# Patient Record
Sex: FEMALE | Race: WHITE | URBAN - METROPOLITAN AREA
[De-identification: names, ages, dates, MRNs, and addresses within clinical notes are randomized per-mention and may not be internally consistent; named-entity substitution may affect disease eponyms.]

---

## 2017-12-12 ENCOUNTER — HOSPITAL ENCOUNTER (EMERGENCY)
Facility: CLINIC | Age: 62
Discharge: HOME | End: 2017-12-12
Attending: EMERGENCY MEDICINE | Admitting: EMERGENCY MEDICINE
Payer: COMMERCIAL

## 2017-12-12 VITALS
DIASTOLIC BLOOD PRESSURE: 80 MMHG | RESPIRATION RATE: 20 BRPM | OXYGEN SATURATION: 98 % | TEMPERATURE: 98.1 F | SYSTOLIC BLOOD PRESSURE: 149 MMHG | HEART RATE: 94 BPM
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
## 2017-12-12 DIAGNOSIS — S09.90XA CLOSED HEAD INJURY, INITIAL ENCOUNTER: ICD-10-CM

## 2017-12-12 PROCEDURE — 99283 EMERGENCY DEPT VISIT LOW MDM: CPT

## 2017-12-12 RX ORDER — GINSENG 100 MG
CAPSULE ORAL
Status: DISCONTINUED
Start: 2017-12-12 | End: 2017-12-12 | Stop reason: HOSPADM

## 2017-12-12 ASSESSMENT — ENCOUNTER SYMPTOMS
NUMBNESS: 0
WOUND: 1
WEAKNESS: 0
HEADACHES: 1
NAUSEA: 0
NECK PAIN: 0
VOMITING: 0
FEVER: 0
DIARRHEA: 0
CHILLS: 0

## 2017-12-12 NOTE — ED PROVIDER NOTES
History     Chief Complaint:  Mechanical Fall     HPI   Bobbi Cintron is a non anticoagulated 62 year old female who presents to the emergency department for evaluation following a mechanical fall. The patient states that she was walking in the parking lot of the hotel where she is currently residing at about 1130 this morning when she slipped on the ice, falling to the ground and striking the back of her head on the ground. She sustained a small abrasion to the back of her head as a result, though did not lose consciousness. She additionally struck her right elbow as well and has had subsequent discomfort. This fall was concerning to her and prompted her ED visit today.     She denies any recent intense headache, vision changes or hearing changes, neck pain, extremity weakness or numbness, nausea, vomiting, diarrhea, fevers or chills. The patient denies sustaining any other injuries as a result of this incident and she is able to ambulate without issue. She took 600 mg of ibuprofen this morning for her somewhat chronic low back pain. Her last tetanus shot was approximately one year ago per her report.     Allergies:  Erythromycin  Penicillin [Esters]    Medications:    Doxycycline  Clindamycin  Lutera  Effexor  Trazodone  Provigil   Welchol    Past Medical History:    Anxiety  Back Pain  Breast Cancer  hyperlipidemia    Past Surgical History:    Vascular port placement  Mastectomy  Orthopedic knee surgery    Family History:    No past pertinent family history.    Social History:  Presents alone.   Former Smoker.   Negative for alcohol use.  Marital Status:   [4]    Review of Systems   Constitutional: Negative for chills and fever.   HENT: Negative for hearing loss.    Eyes: Negative for visual disturbance.   Gastrointestinal: Negative for diarrhea, nausea and vomiting.   Musculoskeletal: Negative for gait problem and neck pain.   Skin: Positive for wound.   Neurological: Positive for headaches  (Head Pain). Negative for weakness and numbness.   All other systems reviewed and are negative.    Physical Exam     Patient Vitals for the past 24 hrs:   BP Temp Temp src Pulse Resp SpO2   12/12/17 1139 149/80 98.1  F (36.7  C) Oral 94 20 98 %       Physical Exam  Physical Exam   General: Resting on the bed.  Head: No obvious trauma to head.  Ears, Nose, Throat:  External ears normal.  Nose normal.  No pharyngeal erythema, swelling or exudate.  Midline uvula.    Eyes:  Conjunctivae and EOM are normal.  Pupils are equal, round, and reactive.   Neck: Normal range of motion.  Neck supple.   CV: Regular rate and rhythm.  No murmurs.      Respiratory: Effort normal and breath sounds normal.  No wheezing or crackles.   Gastrointestinal: Soft.  No distension. There is no tenderness.  There is no rigidity, no rebound and no guarding.   Musculoskeletal: Normal range of motion.   Neuro: Alert. Moving all extremities appropriately.  Normal speech.  CN II-XII grossly intact.  Gross muscle strength intact of the proximal and distal bilateral upper and lower extremities.  Sensation intact to light touch in all 4 extremities.  Normal gait.  GCS 15.    Skin: Skin is warm and dry.  No rash noted.     Emergency Department Course   Interventions:  Bacitracin 500 mg Topical     Emergency Department Course:  Nursing notes and vitals reviewed. 1139 I performed an exam of the patient as documented above.     Findings and plan explained to the Patient. Patient discharged home with instructions regarding supportive care, medications, and reasons to return. The importance of close follow-up was reviewed.     Impression & Plan    Medical Decision Making:  Bobbi Cintron is a 62 year old female who is otherwise healthy and is presenting with a fall. She had a mechanical fall on the ice. Vital signs reviewed and are unremarkable. Broad differential pursued, including but not limited to intracranial hemorrhage, concussion, contusion,  skull fracture or dislocation, bony injury, etc. Per [place] head CT rules, the patient does not require imaging. She had no loss of consciousness. She has no gross deformity to suggest skull fracture. She has not vomited. She is alert and orientated, with GCS 15. This does not even appear to be concerning for a concussion at this time. There is a large parietal hematoma, but there are no deformities or injuries. I was able to clinically clear her C-spine per [facility] rules. The remainder of her trauma evaluation showed no other traumatic injuries. She had a small bruise to the posterior aspect of her right elbow, but had full range of motion and no bony tenderness. This does not appear consistent with a fracture or dislocation. She is otherwise doing well. She is not on any blood thinners. No other red flags. She is otherwise young and healthy. We will discharge home for close primary care follow up.     Diagnosis:    ICD-10-CM   1. Fall, initial encounter W19.XXXA   2. Closed head injury, initial encounter S09.90XA       Disposition:  discharged to home  Ruby PALMER, am serving as a scribe on 12/12/2017 at 11:39 AM to personally document services performed by Jami Freire MD based on my observations and the provider's statements to me.     Ruby Preston  12/12/2017   [facility] EMERGENCY DEPARTMENT       Jami Freire MD  12/12/17 2975

## 2017-12-12 NOTE — ED AVS SNAPSHOT
[facility] Emergency Department    [address]    [facility] [address]    Phone:  [phone]    Fax:  [phone]                                       Bobbi Cintron   MRN: [id number]    Department:  [facility] Emergency Department   Date of Visit:  12/12/2017           Patient Information     Date Of Birth          1955        Your diagnoses for this visit were:     Fall, initial encounter     Closed head injury, initial encounter        You were seen by Jami Freire MD.      Follow-up Information     Follow up with [facility]. Schedule an appointment as soon as possible for a visit in 1 day.    Specialties:  Sports Medicine, Pain Management, Obstetrics & Gynecology, Pediatrics, Internal Medicine, Nephrology    Contact information:    [address] [place] [address]  [facility] [address] [phone]        Discharge Instructions       Please follow with your PCP in 2-3 days.  Return to the ED if notice increasing weakness, headache, confusion, not acting like your self, vomiting more than 2 times, or other acute changes.    Please ideally have someone be with you today and keep a close eye on you.      Discharge Instructions  Head Injury    You have been seen today for a head injury. Your evaluation included a history and physical examination. You may have had a CT (CAT) scan performed, though most head injuries do not require a scan. Based on this evaluation, your provider today does not feel that your head injury is serious.    Generally, every Emergency Department visit should have a follow-up clinic visit with either a primary or a specialty clinic/provider. Please follow-up as instructed by your emergency provider today.  Return to the Emergency Department if:    You are confused or you are not acting right.    Your headache gets worse or you start to have a really bad headache even with your recommended  treatment plan.    You vomit (throw up) more than once.    You have a seizure.    You have trouble walking.    You have weakness or paralysis (cannot move) in an arm or a leg.    You have blood or fluid coming from your ears or nose.    You have new symptoms or anything that worries you.    Sleeping:  It is okay for you to sleep, but someone should wake you up if instructed by your provider, and someone should check on you at your usual time to wake up.     Activity:    Do not drive for at least 24 hours.    Do not drive if you have dizzy spells or trouble concentrating, or remembering things.    Do not return to any contact sports until cleared by your regular provider.     MORE INFORMATION:    Concussion:  A concussion is a minor head injury that may cause temporary problems with the way the brain works. Although concussions are important, they are generally not an emergency or a reason that a person needs to be hospitalized. Some concussion symptoms include confusion, amnesia (forgetful), nausea (sick to your stomach) and vomiting (throwing up), dizziness, fatigue, memory or concentration problems, irritability and sleep problems. For most people, concussions are mild and temporary but some will have more severe and persistent symptoms that require on-going care and treatment.  CT Scans: Your evaluation today may have included a CT scan (CAT scan) to look for things like bleeding or a skull fracture (broken bone).  CT scans involve radiation and too many CT scans can cause serious health problems like cancer, especially in children.  Because of this, your provider may not have ordered a CT scan today if they think you are at low risk for a serious or life threatening problem.    If you were given a prescription for medicine here today, be sure to read all of the information (including the package insert) that comes with your prescription.  This will include important information about the medicine, its side  effects, and any warnings that you need to know about.  The pharmacist who fills the prescription can provide more information and answer questions you may have about the medicine.  If you have questions or concerns that the pharmacist cannot address, please call or return to the Emergency Department.     Remember that you can always come back to the Emergency Department if you are not able to see your regular provider in the amount of time listed above, if you get any new symptoms, or if there is anything that worries you.      24 Hour Appointment Hotline       To make an appointment at any [facility], call [phone] ([phone]). If you don't have a family doctor or clinic, we will help you find one. [facility] clinics are conveniently located to serve the needs of you and your family.             Review of your medicines      Our records show that you are taking the medicines listed below. If these are incorrect, please call your family doctor or clinic.        Dose / Directions Last dose taken    clindamycin 150 MG capsule   Commonly known as:  CLEOCIN        1 CAPSULE EVERY 6 HOURS   Refills:  0        doxycycline 100 MG capsule   Commonly known as:  VIBRAMYCIN        1 CAPSULE EVERY 12 HOURS   Refills:  0        EFFEXOR  MG 24 hr capsule   Generic drug:  venlafaxine        2 caps per day   Refills:  0        LUTERA PO        1 TABLET DAILY   Refills:  0        PROVIGIL 200 MG tablet   Generic drug:  modafinil        1 TABLET EVERY MORNING   Refills:  0        traZODone 50 MG tablet   Commonly known as:  DESYREL        one tab per day   Refills:  0                Orders Needing Specimen Collection     None      Pending Results     No orders found from 12/10/2017 to 12/13/2017.            Pending Culture Results     No orders found from 12/10/2017 to 12/13/2017.            Pending Results Instructions     If you had any lab results that were not finalized at the time of your Discharge, you can  call the ED Lab Result RN at [phone]. You will be contacted by this team for any positive Lab results or changes in treatment. The nurses are available 7 days a week from 10A to 6:30P.  You can leave a message 24 hours per day and they will return your call.        Test Results From Your Hospital Stay               Clinical Quality Measure: Blood Pressure Screening     Your blood pressure was checked while you were in the emergency department today. The last reading we obtained was  BP: 149/80 . Please read the guidelines below about what these numbers mean and what you should do about them.  If your systolic blood pressure (the top number) is less than 120 and your diastolic blood pressure (the bottom number) is less than 80, then your blood pressure is normal. There is nothing more that you need to do about it.  If your systolic blood pressure (the top number) is 120-139 or your diastolic blood pressure (the bottom number) is 80-89, your blood pressure may be higher than it should be. You should have your blood pressure rechecked within a year by a primary care provider.  If your systolic blood pressure (the top number) is 140 or greater or your diastolic blood pressure (the bottom number) is 90 or greater, you may have high blood pressure. High blood pressure is treatable, but if left untreated over time it can put you at risk for heart attack, stroke, or kidney failure. You should have your blood pressure rechecked by a primary care provider within the next 4 weeks.  If your provider in the emergency department today gave you specific instructions to follow-up with your doctor or provider even sooner than that, you should follow that instruction and not wait for up to 4 weeks for your follow-up visit.        Thank you for choosing [facility]       Thank you for choosing [facility] for your care. Our goal is always to provide you with excellent care. Hearing back from our patients is one way we can continue to  "improve our services. Please take a few minutes to complete the written survey that you may receive in the mail after you visit with us. Thank you!        [employer] Information     [employer] lets you send messages to your doctor, view your test results, renew your prescriptions, schedule appointments and more. To sign up, go to [email or web address] . Click on \"Log in\" on the left side of the screen, which will take you to the Welcome page. Then click on \"Sign up Now\" on the right side of the page.     You will be asked to enter the access code listed below, as well as some personal information. Please follow the directions to create your username and password.     Your access code is: [email or web address]  Expires: 3/12/2018 12:21 PM     Your access code will  in 90 days. If you need help or a new code, please call your [facility] clinic or [phone].        Care EveryWhere ID     This is your Care EveryWhere ID. This could be used by other organizations to access your [facility] medical records  [id number]        Equal Access to Services     [facility]: Hadii delaney Bryant, wadorada bernie, qaybparvez degrootalbyron hester, tank khan . So [facility] [phone].    ATENCIÓN: Si habla español, tiene a hyman disposición servicios gratuitos de asistencia lingüística. Angela al [phone].    We comply with applicable federal civil rights laws and [place] laws. We do not discriminate on the basis of race, color, national origin, age, disability, sex, sexual orientation, or gender identity.            After Visit Summary       This is your record. Keep this with you and show to your community pharmacist(s) and doctor(s) at your next visit.                  "

## 2017-12-12 NOTE — ED NOTES
Fall on the ice in the parking lot at the hotel she is staying at. Patient struck the back of her head but denies LOC. Also right elbow discomfort after fall. Presently alert and oriented times four.

## 2017-12-12 NOTE — DISCHARGE INSTRUCTIONS
Please follow with your PCP in 2-3 days.  Return to the ED if notice increasing weakness, headache, confusion, not acting like your self, vomiting more than 2 times, or other acute changes.    Please ideally have someone be with you today and keep a close eye on you.      Discharge Instructions  Head Injury    You have been seen today for a head injury. Your evaluation included a history and physical examination. You may have had a CT (CAT) scan performed, though most head injuries do not require a scan. Based on this evaluation, your provider today does not feel that your head injury is serious.    Generally, every Emergency Department visit should have a follow-up clinic visit with either a primary or a specialty clinic/provider. Please follow-up as instructed by your emergency provider today.  Return to the Emergency Department if:    You are confused or you are not acting right.    Your headache gets worse or you start to have a really bad headache even with your recommended treatment plan.    You vomit (throw up) more than once.    You have a seizure.    You have trouble walking.    You have weakness or paralysis (cannot move) in an arm or a leg.    You have blood or fluid coming from your ears or nose.    You have new symptoms or anything that worries you.    Sleeping:  It is okay for you to sleep, but someone should wake you up if instructed by your provider, and someone should check on you at your usual time to wake up.     Activity:    Do not drive for at least 24 hours.    Do not drive if you have dizzy spells or trouble concentrating, or remembering things.    Do not return to any contact sports until cleared by your regular provider.     MORE INFORMATION:    Concussion:  A concussion is a minor head injury that may cause temporary problems with the way the brain works. Although concussions are important, they are generally not an emergency or a reason that a person needs to be hospitalized. Some  concussion symptoms include confusion, amnesia (forgetful), nausea (sick to your stomach) and vomiting (throwing up), dizziness, fatigue, memory or concentration problems, irritability and sleep problems. For most people, concussions are mild and temporary but some will have more severe and persistent symptoms that require on-going care and treatment.  CT Scans: Your evaluation today may have included a CT scan (CAT scan) to look for things like bleeding or a skull fracture (broken bone).  CT scans involve radiation and too many CT scans can cause serious health problems like cancer, especially in children.  Because of this, your provider may not have ordered a CT scan today if they think you are at low risk for a serious or life threatening problem.    If you were given a prescription for medicine here today, be sure to read all of the information (including the package insert) that comes with your prescription.  This will include important information about the medicine, its side effects, and any warnings that you need to know about.  The pharmacist who fills the prescription can provide more information and answer questions you may have about the medicine.  If you have questions or concerns that the pharmacist cannot address, please call or return to the Emergency Department.     Remember that you can always come back to the Emergency Department if you are not able to see your regular provider in the amount of time listed above, if you get any new symptoms, or if there is anything that worries you.

## 2017-12-12 NOTE — ED AVS SNAPSHOT
[facility] Emergency Department    [address]    [facility] [address]    Phone:  [phone]    Fax:  [phone]                                       Bobbi Cintron   MRN: [id number]    Department:  [facility] Emergency Department   Date of Visit:  12/12/2017           After Visit Summary Signature Page     I have received my discharge instructions, and my questions have been answered. I have discussed any challenges I see with this plan with the nurse or doctor.    ..........................................................................................................................................  Patient/Patient Representative Signature      ..........................................................................................................................................  Patient Representative Print Name and Relationship to Patient    ..................................................               ................................................  Date                                            Time    ..........................................................................................................................................  Reviewed by Signature/Title    ...................................................              ..............................................  Date                                                            Time